# Patient Record
Sex: MALE | Race: WHITE | NOT HISPANIC OR LATINO | Employment: FULL TIME | URBAN - METROPOLITAN AREA
[De-identification: names, ages, dates, MRNs, and addresses within clinical notes are randomized per-mention and may not be internally consistent; named-entity substitution may affect disease eponyms.]

---

## 2019-07-10 ENCOUNTER — OFFICE VISIT (OUTPATIENT)
Dept: URGENT CARE | Facility: MEDICAL CENTER | Age: 64
End: 2019-07-10
Payer: COMMERCIAL

## 2019-07-10 VITALS
TEMPERATURE: 97.9 F | WEIGHT: 162 LBS | HEART RATE: 67 BPM | SYSTOLIC BLOOD PRESSURE: 124 MMHG | HEIGHT: 70 IN | BODY MASS INDEX: 23.19 KG/M2 | DIASTOLIC BLOOD PRESSURE: 83 MMHG | RESPIRATION RATE: 16 BRPM

## 2019-07-10 DIAGNOSIS — M26.609 TMJ DYSFUNCTION: Primary | ICD-10-CM

## 2019-07-10 PROCEDURE — 99203 OFFICE O/P NEW LOW 30 MIN: CPT | Performed by: PHYSICIAN ASSISTANT

## 2019-07-10 RX ORDER — PREDNISONE 20 MG/1
TABLET ORAL
Qty: 18 TABLET | Refills: 0 | Status: SHIPPED | OUTPATIENT
Start: 2019-07-10 | End: 2020-02-14 | Stop reason: ALTCHOICE

## 2019-07-10 RX ORDER — LATANOPROST 50 UG/ML
SOLUTION/ DROPS OPHTHALMIC
Refills: 6 | COMMUNITY
Start: 2019-04-24

## 2019-07-10 NOTE — PATIENT INSTRUCTIONS
Take prednisone as directed  Follow soft diet  Follow up with her dentist symptoms are not improving in the next 2 weeks  Temporomandibular Disorder   WHAT YOU NEED TO KNOW:   Temporomandibular disorder is a condition that causes pain in your jaw  The disorder affects the joint between your temporal bone and your mandible (jawbone)  The muscles and nerves around the joint are also affected  DISCHARGE INSTRUCTIONS:   Medicines:   · Pain medicine: You may be given a prescription medicine to decrease pain  Do not wait until the pain is severe before you take this medicine  · NSAIDs:  These medicines decrease swelling and pain  You can buy NSAIDs without a doctor's order  Ask your healthcare provider which medicine is right for you, and how much to take  Take as directed  NSAIDs can cause stomach bleeding or kidney problems if not taken correctly  · Muscle relaxers  help decrease pain and muscle spasms  · Take your medicine as directed  Contact your healthcare provider if you think your medicine is not helping or if you have side effects  Tell him of her if you are allergic to any medicine  Keep a list of the medicines, vitamins, and herbs you take  Include the amounts, and when and why you take them  Bring the list or the pill bottles to follow-up visits  Carry your medicine list with you in case of an emergency  Follow up with your healthcare provider as directed:  Write down your questions so you remember to ask them during your visits  Manage your symptoms:   · Eat soft foods: Your healthcare provider may suggest that you eat only soft foods for several days  A dietitian may work with you to find foods that are easier to bite, chew, or swallow  Examples are soup, applesauce, cottage cheese, pudding, yogurt, and soft fruits  · Use jaw supporting devices:  Splints may be used to support your jaw or keep your jaw from moving   You may need to wear a mouth guard to keep you from clenching or grinding your teeth while you are sleeping  · Use ice and heat:  Ice helps decrease swelling and pain  Ice may also help prevent tissue damage  Use an ice pack, or put crushed ice in a plastic bag  Cover it with a towel and place it on your jaw for 15 to 20 minutes every hour or as directed  After the first 24 to 48 hours, use heat to decrease pain, swelling, and muscle spasms  Apply heat on the area for 20 to 30 minutes every 2 hours for as many days as directed  Use a heating pad, moist warm compress, or a hot water bottle  · Go to physical therapy:  A physical therapist teaches you exercises to help improve movement and strength, and to decrease pain in your jaw  A speech therapist may help you with swallowing and speech exercises  Contact your healthcare provider if:   · You have a fever  · Your splint or mouth guard is loose  · You have questions or concerns about your condition or care  Return to the emergency department if:   · You have nausea, are vomiting, or cannot keep liquids down  · You have pain that does not go away even after you take your pain medicine  · You have problems breathing, talking, drinking, eating, or swallowing  · Your splint or mouth guard gets damaged or broken  © 2017 [employer]  Information is for End User's use only and may not be sold, redistributed or otherwise used for commercial purposes  All illustrations and images included in CareNotes® are the copyrighted property of [employer]  or [employer]  The above information is an  only  It is not intended as medical advice for individual conditions or treatments  Talk to your doctor, nurse or pharmacist before following any medical regimen to see if it is safe and effective for you

## 2019-07-10 NOTE — PROGRESS NOTES
[facility]        NAME: Lawson Lee is a 60 y o  male  : 1955    MRN: [id number]  DATE: July 10, 2019  TIME: 4:57 PM    Assessment and Plan   TMJ dysfunction [M26 609]  1  TMJ dysfunction  predniSONE 20 mg tablet         Patient Instructions     Follow up with PCP in 3-5 days  Proceed to  ER if symptoms worsen  Take prednisone as directed  Follow soft diet  Follow up with her dentist symptoms are not improving in the next 2 weeks  Chief Complaint     Chief Complaint   Patient presents with    Earache     Patient relates started with left ear pain fro 3 weeks  Has been taking Tylenol for pain  Denies fever  Denies cough and congestion  History of Present Illness       72-year-old male presents for left-sided ear pain for 3 weeks  He denies any fever, chills, sweats, cold symptoms, injury to his ear  He recently visited his dentist a few weeks ago and had a normal examination  Review of Systems   Review of Systems   Constitutional: Negative  HENT: Positive for ear pain  Negative for ear discharge, facial swelling, hearing loss and trouble swallowing            Current Medications       Current Outpatient Medications:     latanoprost (XALATAN) 0 005 % ophthalmic solution, INSTILL 1 DROP INTO BOTH EYES AT BEDTIME, Disp: , Rfl: 6    predniSONE 20 mg tablet, 3 pills daily for 3 days, 2 pills daily for 3 days, 1 pill daily for 3 days, Disp: 18 tablet, Rfl: 0    Current Allergies     Allergies as of 07/10/2019    (No Known Allergies)            The following portions of the patient's history were reviewed and updated as appropriate: allergies, current medications, past family history, past medical history, past social history, past surgical history and problem list     Objective   /83   Pulse 67   Temp 97 9 °F (36 6 °C) (Tympanic)   Resp 16   Ht 5' 10" (1 778 m)   Wt 73 5 kg (162 lb)   BMI 23 24 kg/m²        Physical Exam     Physical Exam   Constitutional: He appears well-developed and well-nourished  No distress  HENT:   Head:       Right Ear: Hearing, tympanic membrane, external ear and ear canal normal    Left Ear: Hearing, tympanic membrane, external ear and ear canal normal    Mouth/Throat: Uvula is midline, oropharynx is clear and moist and mucous membranes are normal    Nursing note and vitals reviewed

## 2020-02-14 ENCOUNTER — OFFICE VISIT (OUTPATIENT)
Dept: URGENT CARE | Facility: MEDICAL CENTER | Age: 65
End: 2020-02-14
Payer: COMMERCIAL

## 2020-02-14 VITALS
HEART RATE: 80 BPM | BODY MASS INDEX: 23.19 KG/M2 | TEMPERATURE: 99 F | HEIGHT: 70 IN | WEIGHT: 162 LBS | SYSTOLIC BLOOD PRESSURE: 126 MMHG | OXYGEN SATURATION: 97 % | RESPIRATION RATE: 20 BRPM | DIASTOLIC BLOOD PRESSURE: 87 MMHG

## 2020-02-14 DIAGNOSIS — J00 ACUTE NASOPHARYNGITIS: Primary | ICD-10-CM

## 2020-02-14 PROCEDURE — 99213 OFFICE O/P EST LOW 20 MIN: CPT | Performed by: PHYSICIAN ASSISTANT

## 2020-02-14 RX ORDER — FLUTICASONE PROPIONATE 50 MCG
2 SPRAY, SUSPENSION (ML) NASAL DAILY
Qty: 16 G | Refills: 0 | Status: SHIPPED | OUTPATIENT
Start: 2020-02-14

## 2020-02-14 RX ORDER — CETIRIZINE HYDROCHLORIDE 10 MG/1
10 TABLET ORAL DAILY
Qty: 30 TABLET | Refills: 0 | Status: SHIPPED | OUTPATIENT
Start: 2020-02-14

## 2020-02-14 RX ORDER — BENZONATATE 100 MG/1
100 CAPSULE ORAL 3 TIMES DAILY PRN
Qty: 15 CAPSULE | Refills: 0 | Status: SHIPPED | OUTPATIENT
Start: 2020-02-14

## 2020-02-14 NOTE — PATIENT INSTRUCTIONS
use medications as directed for symptomatic relief as needed   continue Motrin and/or Tylenol as needed for fevers aches and pains   drink plenty of fluids stay well hydrated  Follow up with PCP in 3-5 days  Proceed to  ER if symptoms worsen  Cold Symptoms   AMBULATORY CARE:   Cold symptoms  include sneezing, dry throat, a stuffy nose, headache, watery eyes, and a cough  Your cough may be dry, or you may cough up mucus  You may also have muscle aches, joint pain, and tiredness  Rarely, you may have a fever  Cold symptoms occur from inflammation in your upper respiratory system caused by a virus  Most colds go away without treatment  Seek care immediately if:   · You have increased tiredness and weakness  · You are unable to eat  · Your heart is beating much faster than usual for you  · You see white spots in the back of your throat and your neck is swollen and sore to the touch  · You see pinpoint or larger reddish-purple dots on your skin  Contact your healthcare provider if:   · You have a fever higher than 102°F (38 9°C)  · You have new or worsening shortness of breath  · You have thick nasal drainage for more than 2 days  · Your symptoms do not improve or get worse within 5 days  · You have questions or concerns about your condition or care  Treatment for cold symptoms  may include NSAIDS to decrease muscle aches and fever  Cold medicines may also be given to decrease coughing, nasal stuffiness, sneezing, and a runny nose  Manage your cold symptoms: The following may help relieve cold symptoms, such as a dry throat and congestion:  · Gargle with mouthwash or warm salt water as directed  · Suck on throat lozenges or hard candy  · Use a cold or warm vaporizer or humidifier to ease your breathing  · Rest for at least 2 days and then as needed to decrease tiredness and weakness       · Use petroleum based jelly around your nostrils to decrease irritation from blowing your nose  · Drink plenty of liquids  Liquids will help thin and loosen thick mucus so you can cough it up  Liquids will also keep you hydrated  Ask your healthcare provider which liquids are best for you and how much to drink each day  Prevent the spread of germs  by washing your hands often  You can spread your cold germs to others for at least 3 days after your symptoms start  Do not share items, such as eating utensils  Cover your nose and mouth when you cough or sneeze using the crook of your elbow instead of your hands  Throw used tissues in the garbage  Do not smoke:  Smoking may worsen your symptoms and increase the length of time you feel sick  Talk with your healthcare provider if you need help to stop smoking  Follow up with your healthcare provider as directed:  Write down your questions so you remember to ask them during your visits  © 2017 [facility] [place][note 2] Information is for End User's use only and may not be sold, redistributed or otherwise used for commercial purposes  All illustrations and images included in CareNotes® are the copyrighted property of [employer]  or [employer]  The above information is an  only  It is not intended as medical advice for individual conditions or treatments  Talk to your doctor, nurse or pharmacist before following any medical regimen to see if it is safe and effective for you

## 2020-02-14 NOTE — PROGRESS NOTES
[facility]        NAME: Alex Lopez is a 60 y o  male  : 1955    MRN: [id number]  DATE: 2020  TIME: 12:03 PM    Assessment and Plan   Acute nasopharyngitis [J00]  1  Acute nasopharyngitis  cetirizine (ZyrTEC) 10 mg tablet    fluticasone (FLONASE) 50 mcg/act nasal spray    benzonatate (TESSALON PERLES) 100 mg capsule         Patient Instructions      use medications as directed for symptomatic relief as needed   continue Motrin and/or Tylenol as needed for fevers aches and pains   drink plenty of fluids stay well hydrated  Follow up with PCP in 3-5 days  Proceed to  ER if symptoms worsen  Chief Complaint     Chief Complaint   Patient presents with    Cough     Patient states he started getting sick since Monday  Patient complains of sore throat, cough, ears hurt, and fatigue  Patient did not sleep last night and called out today         History of Present Illness       30-year-old male presents with cough congestion sore throat facial pressure  Denies any abdominal pain nausea vomiting diarrhea  No chest pain shortness of breath or wheezing  Denies any ear pain  dry cough reported  URI    This is a new problem  The current episode started yesterday  The problem has been waxing and waning  Maximum temperature: Subjective  The fever has been present for 1 to 2 days  Associated symptoms include congestion, coughing, rhinorrhea and a sore throat  Pertinent negatives include no abdominal pain, chest pain, diarrhea, dysuria, nausea or wheezing  He has tried nothing for the symptoms  The treatment provided no relief  Review of Systems   Review of Systems   Constitutional: Negative  HENT: Positive for congestion, rhinorrhea and sore throat  Respiratory: Positive for cough  Negative for wheezing  Cardiovascular: Negative  Negative for chest pain  Gastrointestinal: Negative  Negative for abdominal pain, diarrhea and nausea     Genitourinary: Negative for dysuria  Musculoskeletal: Negative  Skin: Negative  Neurological: Negative  Current Medications       Current Outpatient Medications:     latanoprost (XALATAN) 0 005 % ophthalmic solution, INSTILL 1 DROP INTO BOTH EYES AT BEDTIME, Disp: , Rfl: 6    benzonatate (TESSALON PERLES) 100 mg capsule, Take 1 capsule (100 mg total) by mouth 3 (three) times a day as needed for cough, Disp: 15 capsule, Rfl: 0    cetirizine (ZyrTEC) 10 mg tablet, Take 1 tablet (10 mg total) by mouth daily, Disp: 30 tablet, Rfl: 0    fluticasone (FLONASE) 50 mcg/act nasal spray, 2 sprays into each nostril daily, Disp: 16 g, Rfl: 0    Current Allergies     Allergies as of 02/14/2020    (No Known Allergies)            The following portions of the patient's history were reviewed and updated as appropriate: allergies, current medications, past family history, past medical history, past social history, past surgical history and problem list      Past Medical History:   Diagnosis Date    Glaucoma        Past Surgical History:   Procedure Laterality Date    JOINT REPLACEMENT      Bilaeral knee replacements       History reviewed  No pertinent family history  Medications have been verified  Objective   /87   Pulse 80   Temp 99 °F (37 2 °C)   Resp 20   Ht 5' 10" (1 778 m)   Wt 73 5 kg (162 lb)   SpO2 97%   BMI 23 24 kg/m²        Physical Exam     Physical Exam   Constitutional: He is oriented to person, place, and time  He appears well-developed and well-nourished  No distress  HENT:   Head: Normocephalic and atraumatic  Right Ear: Hearing, tympanic membrane, external ear and ear canal normal    Left Ear: Hearing, tympanic membrane, external ear and ear canal normal    Nose: Nose normal    Mouth/Throat: Uvula is midline and mucous membranes are normal  Posterior oropharyngeal erythema (Mild) present  No oropharyngeal exudate  Eyes: Conjunctivae and EOM are normal  Right eye exhibits no discharge  Left eye exhibits no discharge  Neck: Normal range of motion  Neck supple  Cardiovascular: Normal rate, regular rhythm, normal heart sounds and intact distal pulses  No murmur heard  Pulmonary/Chest: Effort normal and breath sounds normal  No respiratory distress  He has no wheezes  He has no rales  Abdominal: Soft  Bowel sounds are normal  There is no tenderness  Musculoskeletal: Normal range of motion  Lymphadenopathy:     He has no cervical adenopathy  Neurological: He is alert and oriented to person, place, and time  Skin: Skin is warm and dry  Psychiatric: He has a normal mood and affect  Nursing note and vitals reviewed

## 2021-04-09 ENCOUNTER — IMMUNIZATIONS (OUTPATIENT)
Dept: FAMILY MEDICINE CLINIC | Facility: HOSPITAL | Age: 66
End: 2021-04-09

## 2021-04-09 DIAGNOSIS — Z23 ENCOUNTER FOR IMMUNIZATION: Primary | ICD-10-CM

## 2021-04-09 PROCEDURE — 0011A SARS-COV-2 / COVID-19 MRNA VACCINE (MODERNA) 100 MCG: CPT

## 2021-04-09 PROCEDURE — 91301 SARS-COV-2 / COVID-19 MRNA VACCINE (MODERNA) 100 MCG: CPT

## 2021-05-12 ENCOUNTER — IMMUNIZATIONS (OUTPATIENT)
Dept: FAMILY MEDICINE CLINIC | Facility: HOSPITAL | Age: 66
End: 2021-05-12

## 2021-05-12 DIAGNOSIS — Z23 ENCOUNTER FOR IMMUNIZATION: Primary | ICD-10-CM

## 2021-05-12 PROCEDURE — 91301 SARS-COV-2 / COVID-19 MRNA VACCINE (MODERNA) 100 MCG: CPT

## 2021-05-12 PROCEDURE — 0012A SARS-COV-2 / COVID-19 MRNA VACCINE (MODERNA) 100 MCG: CPT

## 2024-03-04 ENCOUNTER — OFFICE VISIT (OUTPATIENT)
Dept: FAMILY MEDICINE CLINIC | Facility: CLINIC | Age: 69
End: 2024-03-04
Payer: MEDICARE

## 2024-03-04 VITALS
OXYGEN SATURATION: 97 % | HEIGHT: 69 IN | DIASTOLIC BLOOD PRESSURE: 60 MMHG | BODY MASS INDEX: 25.53 KG/M2 | SYSTOLIC BLOOD PRESSURE: 124 MMHG | HEART RATE: 63 BPM | TEMPERATURE: 97.7 F | WEIGHT: 172.4 LBS

## 2024-03-04 DIAGNOSIS — Z13.220 NEED FOR LIPID SCREENING: ICD-10-CM

## 2024-03-04 DIAGNOSIS — Z12.11 SCREENING FOR COLON CANCER: ICD-10-CM

## 2024-03-04 DIAGNOSIS — Z12.5 SCREENING FOR PROSTATE CANCER: ICD-10-CM

## 2024-03-04 DIAGNOSIS — Z00.00 INITIAL MEDICARE ANNUAL WELLNESS VISIT: Primary | ICD-10-CM

## 2024-03-04 PROBLEM — H40.10X0 OPEN-ANGLE GLAUCOMA OF BOTH EYES: Status: ACTIVE | Noted: 2024-03-04

## 2024-03-04 PROCEDURE — G0438 PPPS, INITIAL VISIT: HCPCS | Performed by: FAMILY MEDICINE

## 2024-03-04 NOTE — PROGRESS NOTES
"Chief Complaint   Patient presents with    Establish Care     Order cologuard Medicare Wellness Visit        HPI   69-year-old male presents as a new patient.  Past medical history is unremarkable.  No chronic medical problems.  Does not take any medications.  Allergic to ampicillin which causes hives.  Non-smoker.  No alcohol.      Past Medical History:   Diagnosis Date    Glaucoma     Open-angle glaucoma of both eyes 03/04/2024        Past Surgical History:   Procedure Laterality Date    JOINT REPLACEMENT      Bilaeral knee replacements       Social History     Tobacco Use    Smoking status: Never    Smokeless tobacco: Never   Substance Use Topics    Alcohol use: Never       Social History     Social History Narrative     since 1979.  Together since wife was 16.    2 sons.  No grandchildren.    Retired at age 67.  Was a .    Enjoys [employer] airplanes, motorcycle, and purchased an RV.  First trip to [place] 4/9/2024.        The following portions of the patient's history were reviewed and updated as appropriate: allergies, current medications, past family history, past medical history, past social history, past surgical history, and problem list.      Review of Systems   No chest pain or shortness of breath.  Bowels are okay.  Urine stream good.  No difficulty with erections.    /60 (BP Location: Left arm, Patient Position: Sitting, Cuff Size: Standard)   Pulse 63   Temp 97.7 °F (36.5 °C) (Temporal)   Ht 5' 9\" (1.753 m)   Wt 78.2 kg (172 lb 6.4 oz)   SpO2 97%   BMI 25.46 kg/m²      Physical Exam   Appears well.  Lungs are clear.  Heart regular with no murmurs or gallops.  Abdomen soft and nontender.  Mood is upbeat.  Affect appropriate.              Current Outpatient Medications:     latanoprost (XALATAN) 0.005 % ophthalmic solution, INSTILL 1 DROP INTO BOTH EYES AT BEDTIME, Disp: , Rfl: 6     No problem-specific Assessment & Plan notes found for this encounter.       Diagnoses and " all orders for this visit:    Initial Medicare annual wellness visit    Screening for colon cancer  -     Cologuard    Need for lipid screening  -     Lipid panel; Future    Screening for prostate cancer  -     PSA, Total Screen; Future        Patient Instructions   Medicare wellness exam is completed.  Cologuard for screening for colon cancer.  Recommended vaccines include shingles and pneumonia.  He will consider.  Blood work discussed.  Not unreasonable to do a cholesterol test although he has no risk factors and his parents lived well into the 80s and 90s.  Pros and cons of prostate cancer screening discussed and handout given from the choosing wisely program.  After consideration, opts to have PSA done.  Will also check lipid profile.  Recheck 1 year or as needed.    Medicare Preventive Visit Patient Instructions  Thank you for completing your Welcome to Medicare Visit or Medicare Annual Wellness Visit today. Your next wellness visit will be due in one year (3/5/2025).  The screening/preventive services that you may require over the next 5-10 years are detailed below. Some tests may not apply to you based off risk factors and/or age. Screening tests ordered at today's visit but not completed yet may show as past due. Also, please note that scanned in results may not display below.  Preventive Screenings:  Service Recommendations Previous Testing/Comments   Colorectal Cancer Screening  Colonoscopy    Fecal Occult Blood Test (FOBT)/Fecal Immunochemical Test (FIT)  Fecal DNA/Cologuard Test  Flexible Sigmoidoscopy Age: 45-75 years old   Colonoscopy: every 10 years (May be performed more frequently if at higher risk)  OR  FOBT/FIT: every 1 year  OR  Cologuard: every 3 years  OR  Sigmoidoscopy: every 5 years  Screening may be recommended earlier than age 45 if at higher risk for colorectal cancer. Also, an individualized decision between you and your healthcare provider will decide whether screening between the ages  of 76-85 would be appropriate. Colonoscopy: Not on file  FOBT/FIT: Not on file  Cologuard: Not on file  Sigmoidoscopy: Not on file          Prostate Cancer Screening Individualized decision between patient and health care provider in men between ages of 55-69   Medicare will cover every 12 months beginning on the day after your 50th birthday PSA: No results in last 5 years           Hepatitis C Screening Once for adults born between 1945 and 1965  More frequently in patients at high risk for Hepatitis C Hep C Antibody: Not on file        Diabetes Screening 1-2 times per year if you're at risk for diabetes or have pre-diabetes Fasting glucose: No results in last 5 years (No results in last 5 years)  A1C: No results in last 5 years (No results in last 5 years)      Cholesterol Screening Once every 5 years if you don't have a lipid disorder. May order more often based on risk factors. Lipid panel: Not on file         Other Preventive Screenings Covered by Medicare:  Abdominal Aortic Aneurysm (AAA) Screening: covered once if your at risk. You're considered to be at risk if you have a family history of AAA or a male between the age of 65-75 who smoking at least 100 cigarettes in your lifetime.  Lung Cancer Screening: covers low dose CT scan once per year if you meet all of the following conditions: (1) Age 55-77; (2) No signs or symptoms of lung cancer; (3) Current smoker or have quit smoking within the last 15 years; (4) You have a tobacco smoking history of at least 20 pack years (packs per day x number of years you smoked); (5) You get a written order from a healthcare provider.  Glaucoma Screening: covered annually if you're considered high risk: (1) You have diabetes OR (2) Family history of glaucoma OR (3)  aged 50 and older OR (4)  American aged 65 and older  Osteoporosis Screening: covered every 2 years if you meet one of the following conditions: (1) Have a vertebral abnormality; (2) On  glucocorticoid therapy for more than 3 months; (3) Have primary hyperparathyroidism; (4) On osteoporosis medications and need to assess response to drug therapy.  HIV Screening: covered annually if you're between the age of 15-65. Also covered annually if you are younger than 15 and older than 65 with risk factors for HIV infection. For pregnant patients, it is covered up to 3 times per pregnancy.    Immunizations:  Immunization Recommendations   Influenza Vaccine Annual influenza vaccination during flu season is recommended for all persons aged >= 6 months who do not have contraindications   Pneumococcal Vaccine   * Pneumococcal conjugate vaccine = PCV13 (Prevnar 13), PCV15 (Vaxneuvance), PCV20 (Prevnar 20)  * Pneumococcal polysaccharide vaccine = PPSV23 (Pneumovax) Adults 19-63 yo with certain risk factors or if 65+ yo  If never received any pneumonia vaccine: recommend Prevnar 20 (PCV20)  Give PCV20 if previously received 1 dose of PCV13 or PPSV23   Hepatitis B Vaccine 3 dose series if at intermediate or high risk (ex: diabetes, end stage renal disease, liver disease)   Respiratory syncytial virus (RSV) Vaccine - COVERED BY MEDICARE PART D  * RSVPreF3 (Arexvy) CDC recommends that adults 60 years of age and older may receive a single dose of RSV vaccine using shared clinical decision-making (SCDM)   Tetanus (Td) Vaccine - COST NOT COVERED BY MEDICARE PART B Following completion of primary series, a booster dose should be given every 10 years to maintain immunity against tetanus. Td may also be given as tetanus wound prophylaxis.   Tdap Vaccine - COST NOT COVERED BY MEDICARE PART B Recommended at least once for all adults. For pregnant patients, recommended with each pregnancy.   Shingles Vaccine (Shingrix) - COST NOT COVERED BY MEDICARE PART B  2 shot series recommended in those 19 years and older who have or will have weakened immune systems or those 50 years and older     Health Maintenance Due:      Topic Date  Due    Hepatitis C Screening  Never done    Colorectal Cancer Screening  Never done     Immunizations Due:      Topic Date Due    Pneumococcal Vaccine: 65+ Years (1 of 1 - PCV) Never done    Influenza Vaccine (1) Never done    COVID-19 Vaccine (3 - 2023-24 season) 09/01/2023     Advance Directives   What are advance directives?  Advance directives are legal documents that state your wishes and plans for medical care. These plans are made ahead of time in case you lose your ability to make decisions for yourself. Advance directives can apply to any medical decision, such as the treatments you want, and if you want to donate organs.   What are the types of advance directives?  There are many types of advance directives, and each state has rules about how to use them. You may choose a combination of any of the following:  Living will:  This is a written record of the treatment you want. You can also choose which treatments you do not want, which to limit, and which to stop at a certain time. This includes surgery, medicine, IV fluid, and tube feedings.   Durable power of  for healthcare (DPAHC):  This is a written record that states who you want to make healthcare choices for you when you are unable to make them for yourself. This person, called a proxy, is usually a family member or a friend. You may choose more than 1 proxy.  Do not resuscitate (DNR) order:  A DNR order is used in case your heart stops beating or you stop breathing. It is a request not to have certain forms of treatment, such as CPR. A DNR order may be included in other types of advance directives.  Medical directive:  This covers the care that you want if you are in a coma, near death, or unable to make decisions for yourself. You can list the treatments you want for each condition. Treatment may include pain medicine, surgery, blood transfusions, dialysis, IV or tube feedings, and a ventilator (breathing machine).  Values history:  This  document has questions about your views, beliefs, and how you feel and think about life. This information can help others choose the care that you would choose.  Why are advance directives important?  An advance directive helps you control your care. Although spoken wishes may be used, it is better to have your wishes written down. Spoken wishes can be misunderstood, or not followed. Treatments may be given even if you do not want them. An advance directive may make it easier for your family to make difficult choices about your care.   Weight Management   Why it is important to manage your weight:  Being overweight increases your risk of health conditions such as heart disease, high blood pressure, type 2 diabetes, and certain types of cancer. It can also increase your risk for osteoarthritis, sleep apnea, and other respiratory problems. Aim for a slow, steady weight loss. Even a small amount of weight loss can lower your risk of health problems.  How to lose weight safely:  A safe and healthy way to lose weight is to eat fewer calories and get regular exercise. You can lose up about 1 pound a week by decreasing the number of calories you eat by 500 calories each day.   Healthy meal plan for weight management:  A healthy meal plan includes a variety of foods, contains fewer calories, and helps you stay healthy. A healthy meal plan includes the following:  Eat whole-grain foods more often.  A healthy meal plan should contain fiber. Fiber is the part of grains, fruits, and vegetables that is not broken down by your body. Whole-grain foods are healthy and provide extra fiber in your diet. Some examples of whole-grain foods are whole-wheat breads and pastas, oatmeal, brown rice, and bulgur.  Eat a variety of vegetables every day.  Include dark, leafy greens such as spinach, kale, j carlos greens, and mustard greens. Eat yellow and orange vegetables such as carrots, sweet potatoes, and winter squash.   Eat a variety of  fruits every day.  Choose fresh or canned fruit (canned in its own juice or light syrup) instead of juice. Fruit juice has very little or no fiber.  Eat low-fat dairy foods.  Drink fat-free (skim) milk or 1% milk. Eat fat-free yogurt and low-fat cottage cheese. Try low-fat cheeses such as mozzarella and other reduced-fat cheeses.  Choose meat and other protein foods that are low in fat.  Choose beans or other legumes such as split peas or lentils. Choose fish, skinless poultry (chicken or turkey), or lean cuts of red meat (beef or pork). Before you cook meat or poultry, cut off any visible fat.   Use less fat and oil.  Try baking foods instead of frying them. Add less fat, such as margarine, sour cream, regular salad dressing and mayonnaise to foods. Eat fewer high-fat foods. Some examples of high-fat foods include french fries, doughnuts, ice cream, and cakes.  Eat fewer sweets.  Limit foods and drinks that are high in sugar. This includes candy, cookies, regular soda, and sweetened drinks.  Exercise:  Exercise at least 30 minutes per day on most days of the week. Some examples of exercise include walking, biking, dancing, and swimming. You can also fit in more physical activity by taking the stairs instead of the elevator or parking farther away from stores. Ask your healthcare provider about the best exercise plan for you.      © Copyright [employer] 2018 Information is for End User's use only and may not be sold, redistributed or otherwise used for commercial purposes. All illustrations and images included in CareNotes® are the copyrighted property of [employer] or [employer]

## 2024-03-04 NOTE — PROGRESS NOTES
Assessment and Plan:     Problem List Items Addressed This Visit    None  Visit Diagnoses       Medicare annual wellness visit, subsequent    -  Primary               Preventive health issues were discussed with patient, and age appropriate screening tests were ordered as noted in patient's After Visit Summary.  Personalized health advice and appropriate referrals for health education or preventive services given if needed, as noted in patient's After Visit Summary.     History of Present Illness:     Patient presents for a Medicare Wellness Visit    HPI   Patient Care Team:  Tanner Lantigua MD as PCP - General (Family Medicine)     Review of Systems:     Review of Systems     Problem List:     There is no problem list on file for this patient.     Past Medical and Surgical History:     Past Medical History:   Diagnosis Date    Glaucoma      Past Surgical History:   Procedure Laterality Date    JOINT REPLACEMENT      Bilaeral knee replacements      Family History:     No family history on file.   Social History:     Social History     Socioeconomic History    Marital status: Single     Spouse name: None    Number of children: None    Years of education: None    Highest education level: None   Occupational History    None   Tobacco Use    Smoking status: Never    Smokeless tobacco: Never   Vaping Use    Vaping status: Never Used   Substance and Sexual Activity    Alcohol use: Never    Drug use: Never    Sexual activity: None   Other Topics Concern    None   Social History Narrative    None     Social Determinants of Health     Financial Resource Strain: Not on file   Food Insecurity: Not on file   Transportation Needs: Not on file   Physical Activity: Not on file   Stress: Not on file   Social Connections: Not on file   Intimate Partner Violence: Not on file   Housing Stability: Not on file      Medications and Allergies:     Current Outpatient Medications   Medication Sig Dispense Refill    latanoprost (XALATAN)  0.005 % ophthalmic solution INSTILL 1 DROP INTO BOTH EYES AT BEDTIME  6     No current facility-administered medications for this visit.     Allergies   Allergen Reactions    Ampicillin Hives      Immunizations:     Immunization History   Administered Date(s) Administered    COVID-19 MODERNA VACC 0.5 ML IM 04/09/2021, 05/12/2021      Health Maintenance:         Topic Date Due    Hepatitis C Screening  Never done    Colorectal Cancer Screening  Never done         Topic Date Due    Pneumococcal Vaccine: 65+ Years (1 of 1 - PCV) Never done    Influenza Vaccine (1) Never done    COVID-19 Vaccine (3 - 2023-24 season) 09/01/2023      Medicare Screening Tests and Risk Assessments:     Rodri is here for his Subsequent Wellness visit.     Health Risk Assessment:   Patient rates overall health as very good. Patient feels that their physical health rating is same. Patient is satisfied with their life. Eyesight was rated as same. Hearing was rated as same. Patient feels that their emotional and mental health rating is slightly better. Patients states they are never, rarely angry. Patient states they are never, rarely unusually tired/fatigued. Pain experienced in the last 7 days has been none. Patient states that he has experienced no weight loss or gain in last 6 months.     Depression Screening:   PHQ-2 Score: 0      Fall Risk Screening:   In the past year, patient has experienced: no history of falling in past year      Home Safety:  Patient does not have trouble with stairs inside or outside of their home. Patient has working smoke alarms and has working carbon monoxide detector. Home safety hazards include: none.     Nutrition:   Current diet is Regular.     Medications:   Patient is not currently taking any over-the-counter supplements. Patient is able to manage medications.     Activities of Daily Living (ADLs)/Instrumental Activities of Daily Living (IADLs):   Walk and transfer into and out of bed and chair?:  "Yes  Dress and groom yourself?: Yes    Bathe or shower yourself?: Yes    Feed yourself? Yes  Do your laundry/housekeeping?: Yes  Manage your money, pay your bills and track your expenses?: Yes  Make your own meals?: Yes    Do your own shopping?: Yes    Previous Hospitalizations:   Any hospitalizations or ED visits within the last 12 months?: No      Advance Care Planning:   Living will: Yes    Durable POA for healthcare: Yes    Advanced directive: Yes      PREVENTIVE SCREENINGS        Abdominal Aortic Aneurysm (AAA) Screening:    Risk factors include: age between 65-76 yo        Lung Cancer Screening:     General: Screening Not Indicated    Screening, Brief Intervention, and Referral to Treatment (SBIRT)    Screening  Typical number of drinks in a day: 0  Typical number of drinks in a week: 0  Interpretation: Low risk drinking behavior.    Single Item Drug Screening:  How often have you used an illegal drug (including marijuana) or a prescription medication for non-medical reasons in the past year? never    Single Item Drug Screen Score: 0  Interpretation: Negative screen for possible drug use disorder    No results found.     Physical Exam:     /60 (BP Location: Left arm, Patient Position: Sitting, Cuff Size: Standard)   Pulse 63   Temp 97.7 °F (36.5 °C) (Temporal)   Ht 5' 9\" (1.753 m)   Wt 78.2 kg (172 lb 6.4 oz)   SpO2 97%   BMI 25.46 kg/m²     Physical Exam     Tanner Lantigua MD  "

## 2024-03-04 NOTE — PATIENT INSTRUCTIONS
Medicare wellness exam is completed.  Cologuard for screening for colon cancer.  Recommended vaccines include shingles and pneumonia.  He will consider.  Blood work discussed.  Not unreasonable to do a cholesterol test although he has no risk factors and his parents lived well into the 80s and 90s.  Pros and cons of prostate cancer screening discussed and handout given from the choosing wisely program.  After consideration, opts to have PSA done.  Will also check lipid profile.  Recheck 1 year or as needed.    Medicare Preventive Visit Patient Instructions  Thank you for completing your Welcome to Medicare Visit or Medicare Annual Wellness Visit today. Your next wellness visit will be due in one year (3/5/2025).  The screening/preventive services that you may require over the next 5-10 years are detailed below. Some tests may not apply to you based off risk factors and/or age. Screening tests ordered at today's visit but not completed yet may show as past due. Also, please note that scanned in results may not display below.  Preventive Screenings:  Service Recommendations Previous Testing/Comments   Colorectal Cancer Screening  Colonoscopy    Fecal Occult Blood Test (FOBT)/Fecal Immunochemical Test (FIT)  Fecal DNA/Cologuard Test  Flexible Sigmoidoscopy Age: 45-75 years old   Colonoscopy: every 10 years (May be performed more frequently if at higher risk)  OR  FOBT/FIT: every 1 year  OR  Cologuard: every 3 years  OR  Sigmoidoscopy: every 5 years  Screening may be recommended earlier than age 45 if at higher risk for colorectal cancer. Also, an individualized decision between you and your healthcare provider will decide whether screening between the ages of 76-85 would be appropriate. Colonoscopy: Not on file  FOBT/FIT: Not on file  Cologuard: Not on file  Sigmoidoscopy: Not on file          Prostate Cancer Screening Individualized decision between patient and health care provider in men between ages of 55-69    Medicare will cover every 12 months beginning on the day after your 50th birthday PSA: No results in last 5 years           Hepatitis C Screening Once for adults born between 1945 and 1965  More frequently in patients at high risk for Hepatitis C Hep C Antibody: Not on file        Diabetes Screening 1-2 times per year if you're at risk for diabetes or have pre-diabetes Fasting glucose: No results in last 5 years (No results in last 5 years)  A1C: No results in last 5 years (No results in last 5 years)      Cholesterol Screening Once every 5 years if you don't have a lipid disorder. May order more often based on risk factors. Lipid panel: Not on file         Other Preventive Screenings Covered by Medicare:  Abdominal Aortic Aneurysm (AAA) Screening: covered once if your at risk. You're considered to be at risk if you have a family history of AAA or a male between the age of 65-75 who smoking at least 100 cigarettes in your lifetime.  Lung Cancer Screening: covers low dose CT scan once per year if you meet all of the following conditions: (1) Age 55-77; (2) No signs or symptoms of lung cancer; (3) Current smoker or have quit smoking within the last 15 years; (4) You have a tobacco smoking history of at least 20 pack years (packs per day x number of years you smoked); (5) You get a written order from a healthcare provider.  Glaucoma Screening: covered annually if you're considered high risk: (1) You have diabetes OR (2) Family history of glaucoma OR (3)  aged 50 and older OR (4)  American aged 65 and older  Osteoporosis Screening: covered every 2 years if you meet one of the following conditions: (1) Have a vertebral abnormality; (2) On glucocorticoid therapy for more than 3 months; (3) Have primary hyperparathyroidism; (4) On osteoporosis medications and need to assess response to drug therapy.  HIV Screening: covered annually if you're between the age of 15-65. Also covered annually if you  are younger than 15 and older than 65 with risk factors for HIV infection. For pregnant patients, it is covered up to 3 times per pregnancy.    Immunizations:  Immunization Recommendations   Influenza Vaccine Annual influenza vaccination during flu season is recommended for all persons aged >= 6 months who do not have contraindications   Pneumococcal Vaccine   * Pneumococcal conjugate vaccine = PCV13 (Prevnar 13), PCV15 (Vaxneuvance), PCV20 (Prevnar 20)  * Pneumococcal polysaccharide vaccine = PPSV23 (Pneumovax) Adults 19-63 yo with certain risk factors or if 65+ yo  If never received any pneumonia vaccine: recommend Prevnar 20 (PCV20)  Give PCV20 if previously received 1 dose of PCV13 or PPSV23   Hepatitis B Vaccine 3 dose series if at intermediate or high risk (ex: diabetes, end stage renal disease, liver disease)   Respiratory syncytial virus (RSV) Vaccine - COVERED BY MEDICARE PART D  * RSVPreF3 (Arexvy) CDC recommends that adults 60 years of age and older may receive a single dose of RSV vaccine using shared clinical decision-making (SCDM)   Tetanus (Td) Vaccine - COST NOT COVERED BY MEDICARE PART B Following completion of primary series, a booster dose should be given every 10 years to maintain immunity against tetanus. Td may also be given as tetanus wound prophylaxis.   Tdap Vaccine - COST NOT COVERED BY MEDICARE PART B Recommended at least once for all adults. For pregnant patients, recommended with each pregnancy.   Shingles Vaccine (Shingrix) - COST NOT COVERED BY MEDICARE PART B  2 shot series recommended in those 19 years and older who have or will have weakened immune systems or those 50 years and older     Health Maintenance Due:      Topic Date Due    Hepatitis C Screening  Never done    Colorectal Cancer Screening  Never done     Immunizations Due:      Topic Date Due    Pneumococcal Vaccine: 65+ Years (1 of 1 - PCV) Never done    Influenza Vaccine (1) Never done    COVID-19 Vaccine (3 - 2023-24  season) 09/01/2023     Advance Directives   What are advance directives?  Advance directives are legal documents that state your wishes and plans for medical care. These plans are made ahead of time in case you lose your ability to make decisions for yourself. Advance directives can apply to any medical decision, such as the treatments you want, and if you want to donate organs.   What are the types of advance directives?  There are many types of advance directives, and each state has rules about how to use them. You may choose a combination of any of the following:  Living will:  This is a written record of the treatment you want. You can also choose which treatments you do not want, which to limit, and which to stop at a certain time. This includes surgery, medicine, IV fluid, and tube feedings.   Durable power of  for healthcare (DPAHC):  This is a written record that states who you want to make healthcare choices for you when you are unable to make them for yourself. This person, called a proxy, is usually a family member or a friend. You may choose more than 1 proxy.  Do not resuscitate (DNR) order:  A DNR order is used in case your heart stops beating or you stop breathing. It is a request not to have certain forms of treatment, such as CPR. A DNR order may be included in other types of advance directives.  Medical directive:  This covers the care that you want if you are in a coma, near death, or unable to make decisions for yourself. You can list the treatments you want for each condition. Treatment may include pain medicine, surgery, blood transfusions, dialysis, IV or tube feedings, and a ventilator (breathing machine).  Values history:  This document has questions about your views, beliefs, and how you feel and think about life. This information can help others choose the care that you would choose.  Why are advance directives important?  An advance directive helps you control your care. Although  spoken wishes may be used, it is better to have your wishes written down. Spoken wishes can be misunderstood, or not followed. Treatments may be given even if you do not want them. An advance directive may make it easier for your family to make difficult choices about your care.   Weight Management   Why it is important to manage your weight:  Being overweight increases your risk of health conditions such as heart disease, high blood pressure, type 2 diabetes, and certain types of cancer. It can also increase your risk for osteoarthritis, sleep apnea, and other respiratory problems. Aim for a slow, steady weight loss. Even a small amount of weight loss can lower your risk of health problems.  How to lose weight safely:  A safe and healthy way to lose weight is to eat fewer calories and get regular exercise. You can lose up about 1 pound a week by decreasing the number of calories you eat by 500 calories each day.   Healthy meal plan for weight management:  A healthy meal plan includes a variety of foods, contains fewer calories, and helps you stay healthy. A healthy meal plan includes the following:  Eat whole-grain foods more often.  A healthy meal plan should contain fiber. Fiber is the part of grains, fruits, and vegetables that is not broken down by your body. Whole-grain foods are healthy and provide extra fiber in your diet. Some examples of whole-grain foods are whole-wheat breads and pastas, oatmeal, brown rice, and bulgur.  Eat a variety of vegetables every day.  Include dark, leafy greens such as spinach, kale, j carlos greens, and mustard greens. Eat yellow and orange vegetables such as carrots, sweet potatoes, and winter squash.   Eat a variety of fruits every day.  Choose fresh or canned fruit (canned in its own juice or light syrup) instead of juice. Fruit juice has very little or no fiber.  Eat low-fat dairy foods.  Drink fat-free (skim) milk or 1% milk. Eat fat-free yogurt and low-fat cottage cheese.  Try low-fat cheeses such as mozzarella and other reduced-fat cheeses.  Choose meat and other protein foods that are low in fat.  Choose beans or other legumes such as split peas or lentils. Choose fish, skinless poultry (chicken or turkey), or lean cuts of red meat (beef or pork). Before you cook meat or poultry, cut off any visible fat.   Use less fat and oil.  Try baking foods instead of frying them. Add less fat, such as margarine, sour cream, regular salad dressing and mayonnaise to foods. Eat fewer high-fat foods. Some examples of high-fat foods include french fries, doughnuts, ice cream, and cakes.  Eat fewer sweets.  Limit foods and drinks that are high in sugar. This includes candy, cookies, regular soda, and sweetened drinks.  Exercise:  Exercise at least 30 minutes per day on most days of the week. Some examples of exercise include walking, biking, dancing, and swimming. You can also fit in more physical activity by taking the stairs instead of the elevator or parking farther away from stores. Ask your healthcare provider about the best exercise plan for you.      © Copyright [employer] 2018 Information is for End User's use only and may not be sold, redistributed or otherwise used for commercial purposes. All illustrations and images included in CareNotes® are the copyrighted property of A.D.A.M., Inc. or [employer]

## 2024-03-16 LAB — COLOGUARD RESULT REPORTABLE: NEGATIVE

## 2024-03-19 ENCOUNTER — LAB (OUTPATIENT)
Dept: LAB | Facility: CLINIC | Age: 69
End: 2024-03-19
Payer: MEDICARE

## 2024-03-19 DIAGNOSIS — Z13.220 NEED FOR LIPID SCREENING: ICD-10-CM

## 2024-03-19 DIAGNOSIS — Z12.5 SCREENING FOR PROSTATE CANCER: ICD-10-CM

## 2024-03-19 LAB
CHOLEST SERPL-MCNC: 196 MG/DL
HDLC SERPL-MCNC: 46 MG/DL
LDLC SERPL CALC-MCNC: 123 MG/DL (ref 0–100)
NONHDLC SERPL-MCNC: 150 MG/DL
PSA SERPL-MCNC: 0.39 NG/ML (ref 0–4)
TRIGL SERPL-MCNC: 133 MG/DL

## 2024-03-19 PROCEDURE — G0103 PSA SCREENING: HCPCS

## 2024-03-19 PROCEDURE — 36415 COLL VENOUS BLD VENIPUNCTURE: CPT

## 2024-03-19 PROCEDURE — 80061 LIPID PANEL: CPT

## 2024-07-02 ENCOUNTER — OFFICE VISIT (OUTPATIENT)
Dept: FAMILY MEDICINE CLINIC | Facility: CLINIC | Age: 69
End: 2024-07-02
Payer: MEDICARE

## 2024-07-02 VITALS
DIASTOLIC BLOOD PRESSURE: 70 MMHG | WEIGHT: 166 LBS | OXYGEN SATURATION: 97 % | SYSTOLIC BLOOD PRESSURE: 122 MMHG | TEMPERATURE: 97.6 F | RESPIRATION RATE: 16 BRPM | BODY MASS INDEX: 24.59 KG/M2 | HEIGHT: 69 IN | HEART RATE: 54 BPM

## 2024-07-02 DIAGNOSIS — H60.543 ECZEMATOID OTITIS EXTERNA OF BOTH EARS, UNSPECIFIED CHRONICITY: Primary | ICD-10-CM

## 2024-07-02 PROCEDURE — G2211 COMPLEX E/M VISIT ADD ON: HCPCS | Performed by: FAMILY MEDICINE

## 2024-07-02 PROCEDURE — 99213 OFFICE O/P EST LOW 20 MIN: CPT | Performed by: FAMILY MEDICINE

## 2024-07-02 RX ORDER — MOMETASONE FUROATE 1 MG/ML
SOLUTION TOPICAL
Qty: 60 ML | Refills: 5 | Status: SHIPPED | OUTPATIENT
Start: 2024-07-02

## 2024-07-02 NOTE — PROGRESS NOTES
"Chief Complaint   Patient presents with    Earache     Painful. Left ear and progressing to the right         HPI   Here with pain in the left ear.  He gets scabby when he puts in his hearing aids.  Having discomfort on the right side as well.  Has had hearing aids for the last 3 or 4 months.  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  Which were not bothersome.    Past Medical History:   Diagnosis Date    Glaucoma     Open-angle glaucoma of both eyes 03/04/2024        Past Surgical History:   Procedure Laterality Date    JOINT REPLACEMENT      Bilaeral knee replacements       Social History     Tobacco Use    Smoking status: Never    Smokeless tobacco: Never   Substance Use Topics    Alcohol use: Never       Social History     Social History Narrative     since 1979.  Together since wife was 16.    2 sons.  No grandchildren.    Retired at age 67.  Was a .    Enjoys [employer] airplanes, motorcycle, and purchased an RV.  First trip to [place] 4/9/2024.Went well.     7/24- going to [place].        The following portions of the patient's history were reviewed and updated as appropriate: allergies, current medications, past family history, past medical history, past social history, past surgical history, and problem list.      Review of Systems       /70 (BP Location: Left arm, Patient Position: Sitting, Cuff Size: Standard)   Pulse (!) 54   Temp 97.6 °F (36.4 °C) (Temporal)   Resp 16   Ht 5' 9\" (1.753 m)   Wt 75.3 kg (166 lb)   SpO2 97%   BMI 24.51 kg/m²      Physical Exam   No tenderness on palpation of the tragus.  Both ear canals are open without irritation seen.  Both eardrums appear white.              Current Outpatient Medications:     latanoprost (XALATAN) 0.005 % ophthalmic solution, INSTILL 1 DROP INTO BOTH EYES AT BEDTIME, Disp: , Rfl: 6    mometasone (ELOCON) 0.1 % lotion, Apply at bedtime if needed for irritation, Disp: 60 mL, Rfl: 5     No problem-specific Assessment " & Plan notes found for this encounter.       Diagnoses and all orders for this visit:    Eczematoid otitis externa of both ears, unspecified chronicity  -     mometasone (ELOCON) 0.1 % lotion; Apply at bedtime if needed for irritation        Patient Instructions   Suspect irritation of the ear canal secondary to the hearing aid.  Apply mometasone lotion gently with a Q-tip to affected areas daily as needed.  Suggest returning to [facility] to see if the piece at the end of the hearing aid that goes in the ear could be changed or adjusted.

## 2024-07-02 NOTE — PATIENT INSTRUCTIONS
Suspect irritation of the ear canal secondary to the hearing aid.  Apply mometasone lotion gently with a Q-tip to affected areas daily as needed.  Suggest returning to [facility] to see if the piece at the end of the hearing aid that goes in the ear could be changed or adjusted.

## 2025-04-21 ENCOUNTER — OFFICE VISIT (OUTPATIENT)
Dept: FAMILY MEDICINE CLINIC | Facility: CLINIC | Age: 70
End: 2025-04-21
Payer: MEDICARE

## 2025-04-21 VITALS
DIASTOLIC BLOOD PRESSURE: 70 MMHG | HEIGHT: 69 IN | TEMPERATURE: 97.9 F | SYSTOLIC BLOOD PRESSURE: 102 MMHG | WEIGHT: 163.6 LBS | OXYGEN SATURATION: 96 % | BODY MASS INDEX: 24.23 KG/M2 | HEART RATE: 62 BPM

## 2025-04-21 DIAGNOSIS — F40.240 CLAUSTROPHOBIA: ICD-10-CM

## 2025-04-21 DIAGNOSIS — K43.9 VENTRAL HERNIA WITHOUT OBSTRUCTION OR GANGRENE: ICD-10-CM

## 2025-04-21 DIAGNOSIS — L28.0 NEURODERMATITIS: ICD-10-CM

## 2025-04-21 DIAGNOSIS — Z00.00 MEDICARE ANNUAL WELLNESS VISIT, SUBSEQUENT: Primary | ICD-10-CM

## 2025-04-21 PROCEDURE — 99213 OFFICE O/P EST LOW 20 MIN: CPT | Performed by: FAMILY MEDICINE

## 2025-04-21 PROCEDURE — G2211 COMPLEX E/M VISIT ADD ON: HCPCS | Performed by: FAMILY MEDICINE

## 2025-04-21 PROCEDURE — G0439 PPPS, SUBSEQ VISIT: HCPCS | Performed by: FAMILY MEDICINE

## 2025-04-21 NOTE — PATIENT INSTRUCTIONS
Overall, excellent health.  Suggest hydrocortisone cream to the rash on the chest.  Ventral hernia could be fixed if it became symptomatic but otherwise can just be observed.  Some discussion about claustrophobia.  Not interested in medications.  Could try doing relaxation exercises.    Medicare Preventive Visit Patient Instructions  Thank you for completing your Welcome to Medicare Visit or Medicare Annual Wellness Visit today. Your next wellness visit will be due in one year (4/22/2026).  The screening/preventive services that you may require over the next 5-10 years are detailed below. Some tests may not apply to you based off risk factors and/or age. Screening tests ordered at today's visit but not completed yet may show as past due. Also, please note that scanned in results may not display below.  Preventive Screenings:  Service Recommendations Previous Testing/Comments   Colorectal Cancer Screening  Colonoscopy    Fecal Occult Blood Test (FOBT)/Fecal Immunochemical Test (FIT)  Fecal DNA/Cologuard Test  Flexible Sigmoidoscopy Age: 45-75 years old   Colonoscopy: every 10 years (May be performed more frequently if at higher risk)  OR  FOBT/FIT: every 1 year  OR  Cologuard: every 3 years  OR  Sigmoidoscopy: every 5 years  Screening may be recommended earlier than age 45 if at higher risk for colorectal cancer. Also, an individualized decision between you and your healthcare provider will decide whether screening between the ages of 76-85 would be appropriate. Colonoscopy: Not on file  FOBT/FIT: Not on file  Cologuard: 03/11/2024  Sigmoidoscopy: Not on file          Prostate Cancer Screening Individualized decision between patient and health care provider in men between ages of 55-69   Medicare will cover every 12 months beginning on the day after your 50th birthday PSA: 0.39 ng/mL           Hepatitis C Screening Once for adults born between 1945 and 1965  More frequently in patients at high risk for Hepatitis C  Hep C Antibody: Not on file        Diabetes Screening 1-2 times per year if you're at risk for diabetes or have pre-diabetes Fasting glucose: No results in last 5 years (No results in last 5 years)  A1C: No results in last 5 years (No results in last 5 years)      Cholesterol Screening Once every 5 years if you don't have a lipid disorder. May order more often based on risk factors. Lipid panel: 03/19/2024         Other Preventive Screenings Covered by Medicare:  Abdominal Aortic Aneurysm (AAA) Screening: covered once if your at risk. You're considered to be at risk if you have a family history of AAA or a male between the age of 65-75 who smoking at least 100 cigarettes in your lifetime.  Lung Cancer Screening: covers low dose CT scan once per year if you meet all of the following conditions: (1) Age 55-77; (2) No signs or symptoms of lung cancer; (3) Current smoker or have quit smoking within the last 15 years; (4) You have a tobacco smoking history of at least 20 pack years (packs per day x number of years you smoked); (5) You get a written order from a healthcare provider.  Glaucoma Screening: covered annually if you're considered high risk: (1) You have diabetes OR (2) Family history of glaucoma OR (3)  aged 50 and older OR (4)  American aged 65 and older  Osteoporosis Screening: covered every 2 years if you meet one of the following conditions: (1) Have a vertebral abnormality; (2) On glucocorticoid therapy for more than 3 months; (3) Have primary hyperparathyroidism; (4) On osteoporosis medications and need to assess response to drug therapy.  HIV Screening: covered annually if you're between the age of 15-65. Also covered annually if you are younger than 15 and older than 65 with risk factors for HIV infection. For pregnant patients, it is covered up to 3 times per pregnancy.    Immunizations:  Immunization Recommendations   Influenza Vaccine Annual influenza vaccination during flu  season is recommended for all persons aged >= 6 months who do not have contraindications   Pneumococcal Vaccine   * Pneumococcal conjugate vaccine = PCV13 (Prevnar 13), PCV15 (Vaxneuvance), PCV20 (Prevnar 20)  * Pneumococcal polysaccharide vaccine = PPSV23 (Pneumovax) Adults 19-63 yo with certain risk factors or if 65+ yo  If never received any pneumonia vaccine: recommend Prevnar 20 (PCV20)  Give PCV20 if previously received 1 dose of PCV13 or PPSV23   Hepatitis B Vaccine 3 dose series if at intermediate or high risk (ex: diabetes, end stage renal disease, liver disease)   Respiratory syncytial virus (RSV) Vaccine - COVERED BY MEDICARE PART D  * RSVPreF3 (Arexvy) CDC recommends that adults 60 years of age and older may receive a single dose of RSV vaccine using shared clinical decision-making (SCDM)   Tetanus (Td) Vaccine - COST NOT COVERED BY MEDICARE PART B Following completion of primary series, a booster dose should be given every 10 years to maintain immunity against tetanus. Td may also be given as tetanus wound prophylaxis.   Tdap Vaccine - COST NOT COVERED BY MEDICARE PART B Recommended at least once for all adults. For pregnant patients, recommended with each pregnancy.   Shingles Vaccine (Shingrix) - COST NOT COVERED BY MEDICARE PART B  2 shot series recommended in those 19 years and older who have or will have weakened immune systems or those 50 years and older     Health Maintenance Due:      Topic Date Due    Hepatitis C Screening  Never done    Colorectal Cancer Screening  03/11/2027     Immunizations Due:      Topic Date Due    Pneumococcal Vaccine: 65+ Years (1 of 1 - PCV) Never done    Influenza Vaccine (1) Never done    COVID-19 Vaccine (3 - 2024-25 season) 09/01/2024     Advance Directives   What are advance directives?  Advance directives are legal documents that state your wishes and plans for medical care. These plans are made ahead of time in case you lose your ability to make decisions for  yourself. Advance directives can apply to any medical decision, such as the treatments you want, and if you want to donate organs.   What are the types of advance directives?  There are many types of advance directives, and each state has rules about how to use them. You may choose a combination of any of the following:  Living will:  This is a written record of the treatment you want. You can also choose which treatments you do not want, which to limit, and which to stop at a certain time. This includes surgery, medicine, IV fluid, and tube feedings.   Durable power of  for healthcare (DPAHC):  This is a written record that states who you want to make healthcare choices for you when you are unable to make them for yourself. This person, called a proxy, is usually a family member or a friend. You may choose more than 1 proxy.  Do not resuscitate (DNR) order:  A DNR order is used in case your heart stops beating or you stop breathing. It is a request not to have certain forms of treatment, such as CPR. A DNR order may be included in other types of advance directives.  Medical directive:  This covers the care that you want if you are in a coma, near death, or unable to make decisions for yourself. You can list the treatments you want for each condition. Treatment may include pain medicine, surgery, blood transfusions, dialysis, IV or tube feedings, and a ventilator (breathing machine).  Values history:  This document has questions about your views, beliefs, and how you feel and think about life. This information can help others choose the care that you would choose.  Why are advance directives important?  An advance directive helps you control your care. Although spoken wishes may be used, it is better to have your wishes written down. Spoken wishes can be misunderstood, or not followed. Treatments may be given even if you do not want them. An advance directive may make it easier for your family to make  difficult choices about your care.       © Copyright [employer] 2018 Information is for End User's use only and may not be sold, redistributed or otherwise used for commercial purposes. All illustrations and images included in CareNotes® are the copyrighted property of [employer]. or [employer]

## 2025-04-21 NOTE — PROGRESS NOTES
Name: Rodri Evans      : 1955      MRN: [id number]  Encounter Provider: Tanner Lantigua MD  Encounter Date: 2025   Encounter department: [facility]  :  Assessment & Plan  Medicare annual wellness visit, subsequent         Ventral hernia without obstruction or gangrene         Neurodermatitis         Claustrophobia         Patient Instructions   Overall, excellent health.  Suggest hydrocortisone cream to the rash on the chest.  Ventral hernia could be fixed if it became symptomatic but otherwise can just be observed.  Some discussion about claustrophobia.  Not interested in medications.  Could try doing relaxation exercises.    Medicare Preventive Visit Patient Instructions  Thank you for completing your Welcome to Medicare Visit or Medicare Annual Wellness Visit today. Your next wellness visit will be due in one year (2026).  The screening/preventive services that you may require over the next 5-10 years are detailed below. Some tests may not apply to you based off risk factors and/or age. Screening tests ordered at today's visit but not completed yet may show as past due. Also, please note that scanned in results may not display below.  Preventive Screenings:  Service Recommendations Previous Testing/Comments   Colorectal Cancer Screening  Colonoscopy    Fecal Occult Blood Test (FOBT)/Fecal Immunochemical Test (FIT)  Fecal DNA/Cologuard Test  Flexible Sigmoidoscopy Age: 45-75 years old   Colonoscopy: every 10 years (May be performed more frequently if at higher risk)  OR  FOBT/FIT: every 1 year  OR  Cologuard: every 3 years  OR  Sigmoidoscopy: every 5 years  Screening may be recommended earlier than age 45 if at higher risk for colorectal cancer. Also, an individualized decision between you and your healthcare provider will decide whether screening between the ages of 76-85 would be appropriate. Colonoscopy: Not on file  FOBT/FIT: Not on file  Cologuard:  03/11/2024  Sigmoidoscopy: Not on file          Prostate Cancer Screening Individualized decision between patient and health care provider in men between ages of 55-69   Medicare will cover every 12 months beginning on the day after your 50th birthday PSA: 0.39 ng/mL           Hepatitis C Screening Once for adults born between 1945 and 1965  More frequently in patients at high risk for Hepatitis C Hep C Antibody: Not on file        Diabetes Screening 1-2 times per year if you're at risk for diabetes or have pre-diabetes Fasting glucose: No results in last 5 years (No results in last 5 years)  A1C: No results in last 5 years (No results in last 5 years)      Cholesterol Screening Once every 5 years if you don't have a lipid disorder. May order more often based on risk factors. Lipid panel: 03/19/2024         Other Preventive Screenings Covered by Medicare:  Abdominal Aortic Aneurysm (AAA) Screening: covered once if your at risk. You're considered to be at risk if you have a family history of AAA or a male between the age of 65-75 who smoking at least 100 cigarettes in your lifetime.  Lung Cancer Screening: covers low dose CT scan once per year if you meet all of the following conditions: (1) Age 55-77; (2) No signs or symptoms of lung cancer; (3) Current smoker or have quit smoking within the last 15 years; (4) You have a tobacco smoking history of at least 20 pack years (packs per day x number of years you smoked); (5) You get a written order from a healthcare provider.  Glaucoma Screening: covered annually if you're considered high risk: (1) You have diabetes OR (2) Family history of glaucoma OR (3)  aged 50 and older OR (4)  American aged 65 and older  Osteoporosis Screening: covered every 2 years if you meet one of the following conditions: (1) Have a vertebral abnormality; (2) On glucocorticoid therapy for more than 3 months; (3) Have primary hyperparathyroidism; (4) On osteoporosis  medications and need to assess response to drug therapy.  HIV Screening: covered annually if you're between the age of 15-65. Also covered annually if you are younger than 15 and older than 65 with risk factors for HIV infection. For pregnant patients, it is covered up to 3 times per pregnancy.    Immunizations:  Immunization Recommendations   Influenza Vaccine Annual influenza vaccination during flu season is recommended for all persons aged >= 6 months who do not have contraindications   Pneumococcal Vaccine   * Pneumococcal conjugate vaccine = PCV13 (Prevnar 13), PCV15 (Vaxneuvance), PCV20 (Prevnar 20)  * Pneumococcal polysaccharide vaccine = PPSV23 (Pneumovax) Adults 19-65 yo with certain risk factors or if 65+ yo  If never received any pneumonia vaccine: recommend Prevnar 20 (PCV20)  Give PCV20 if previously received 1 dose of PCV13 or PPSV23   Hepatitis B Vaccine 3 dose series if at intermediate or high risk (ex: diabetes, end stage renal disease, liver disease)   Respiratory syncytial virus (RSV) Vaccine - COVERED BY MEDICARE PART D  * RSVPreF3 (Arexvy) CDC recommends that adults 60 years of age and older may receive a single dose of RSV vaccine using shared clinical decision-making (SCDM)   Tetanus (Td) Vaccine - COST NOT COVERED BY MEDICARE PART B Following completion of primary series, a booster dose should be given every 10 years to maintain immunity against tetanus. Td may also be given as tetanus wound prophylaxis.   Tdap Vaccine - COST NOT COVERED BY MEDICARE PART B Recommended at least once for all adults. For pregnant patients, recommended with each pregnancy.   Shingles Vaccine (Shingrix) - COST NOT COVERED BY MEDICARE PART B  2 shot series recommended in those 19 years and older who have or will have weakened immune systems or those 50 years and older     Health Maintenance Due:      Topic Date Due   • Hepatitis C Screening  Never done   • Colorectal Cancer Screening  03/11/2027     Immunizations  Due:      Topic Date Due   • Pneumococcal Vaccine: 65+ Years (1 of 1 - PCV) Never done   • Influenza Vaccine (1) Never done   • COVID-19 Vaccine (3 - 2024-25 season) 09/01/2024     Advance Directives   What are advance directives?  Advance directives are legal documents that state your wishes and plans for medical care. These plans are made ahead of time in case you lose your ability to make decisions for yourself. Advance directives can apply to any medical decision, such as the treatments you want, and if you want to donate organs.   What are the types of advance directives?  There are many types of advance directives, and each state has rules about how to use them. You may choose a combination of any of the following:  Living will:  This is a written record of the treatment you want. You can also choose which treatments you do not want, which to limit, and which to stop at a certain time. This includes surgery, medicine, IV fluid, and tube feedings.   Durable power of  for healthcare (DPAHC):  This is a written record that states who you want to make healthcare choices for you when you are unable to make them for yourself. This person, called a proxy, is usually a family member or a friend. You may choose more than 1 proxy.  Do not resuscitate (DNR) order:  A DNR order is used in case your heart stops beating or you stop breathing. It is a request not to have certain forms of treatment, such as CPR. A DNR order may be included in other types of advance directives.  Medical directive:  This covers the care that you want if you are in a coma, near death, or unable to make decisions for yourself. You can list the treatments you want for each condition. Treatment may include pain medicine, surgery, blood transfusions, dialysis, IV or tube feedings, and a ventilator (breathing machine).  Values history:  This document has questions about your views, beliefs, and how you feel and think about life. This  information can help others choose the care that you would choose.  Why are advance directives important?  An advance directive helps you control your care. Although spoken wishes may be used, it is better to have your wishes written down. Spoken wishes can be misunderstood, or not followed. Treatments may be given even if you do not want them. An advance directive may make it easier for your family to make difficult choices about your care.       © Copyright [employer] 2018 Information is for End User's use only and may not be sold, redistributed or otherwise used for commercial purposes. All illustrations and images included in CareNotes® are the copyrighted property of [employer] or [employer]         Preventive health issues were discussed with patient, and age appropriate screening tests were ordered as noted in patient's After Visit Summary. Personalized health advice and appropriate referrals for health education or preventive services given if needed, as noted in patient's After Visit Summary.    History of Present Illness     HPI   Here for Medicare wellness exam.  Continues to do well.  Enjoyed taking his RV to [place] and back.  1 year ago, had lipid profile which was okay.  PSA was very low at 0.39.  Cologuard negative.  Normal blood pressure.  He is not on any medications.  Continues on drops for glaucoma.  Notes that he gets claustrophobia.  Unable to sit in the backseat of a car.  Probably for about 15 years.  Does okay in his RV although prefers to be in the front.  Patient Care Team:  Tanner Lantigua MD as PCP - General (Family Medicine)    Review of Systems  No chest pain or shortness of breath.  Bowels are okay.  Bladder okay.   Had a bit of a rash on the left side of his chest which could have been from his wife's new detergent.  Most of it has resolved.  Medical History Reviewed by provider this encounter:  Tobacco  Allergies  Meds  Problems  Med Hx  Surg Hx  Fam Hx        Annual Wellness Visit Questionnaire   Rodri is here for his Subsequent Wellness visit.     Health Risk Assessment:   Patient rates overall health as very good. Patient feels that their physical health rating is same. Patient is satisfied with their life. Eyesight was rated as same. Hearing was rated as same. Patient feels that their emotional and mental health rating is slightly better. Patients states they are sometimes angry. Patient states they are sometimes unusually tired/fatigued. Pain experienced in the last 7 days has been none. Patient states that he has experienced no weight loss or gain in last 6 months.     Depression Screening:   PHQ-2 Score: 0      Fall Risk Screening:   In the past year, patient has experienced: no history of falling in past year      Home Safety:  Patient does not have trouble with stairs inside or outside of their home. Patient has working smoke alarms and has working carbon monoxide detector. Home safety hazards include: none.     Nutrition:   Current diet is Regular.     Medications:   Patient is not currently taking any over-the-counter supplements. Patient is able to manage medications.     Activities of Daily Living (ADLs)/Instrumental Activities of Daily Living (IADLs):   Walk and transfer into and out of bed and chair?: Yes  Dress and groom yourself?: Yes    Bathe or shower yourself?: Yes    Feed yourself? Yes  Do your laundry/housekeeping?: Yes  Manage your money, pay your bills and track your expenses?: Yes  Make your own meals?: Yes    Do your own shopping?: Yes    Previous Hospitalizations:   Any hospitalizations or ED visits within the last 12 months?: No      Advance Care Planning:   Living will: Yes    Advanced directive: Yes      Preventive Screenings      Cardiovascular Screening:    General: Screening Current      Colorectal Cancer Screening:     General: Screening Current      Abdominal Aortic Aneurysm (AAA) Screening:    Risk factors include: age between  "65-74 yo        Lung Cancer Screening:     General: Screening Not Indicated    Immunizations:  - Immunizations due: Influenza, Prevnar 20 and Zoster (Shingrix)    Screening, Brief Intervention, and Referral to Treatment (SBIRT)     Screening  Typical number of drinks in a day: 0  Typical number of drinks in a week: 0  Interpretation: Low risk drinking behavior.    Single Item Drug Screening:  How often have you used an illegal drug (including marijuana) or a prescription medication for non-medical reasons in the past year? never    Single Item Drug Screen Score: 0  Interpretation: Negative screen for possible drug use disorder    Social Drivers of Health     Financial Resource Strain: Low Risk  (3/4/2024)    Overall Financial Resource Strain (CARDIA)    • Difficulty of Paying Living Expenses: Not hard at all   Food Insecurity: No Food Insecurity (4/21/2025)    Hunger Vital Sign    • Worried About Running Out of Food in the Last Year: Never true    • Ran Out of Food in the Last Year: Never true   Transportation Needs: No Transportation Needs (4/21/2025)    PRAPARE - Transportation    • Lack of Transportation (Medical): No    • Lack of Transportation (Non-Medical): No   Housing Stability: Unknown (4/21/2025)    Housing Stability Vital Sign    • Unable to Pay for Housing in the Last Year: No    • Homeless in the Last Year: No   Utilities: Not At Risk (4/21/2025)    [facility] Utilities    • Threatened with loss of utilities: No     No results found.    Objective   /70 (BP Location: Left arm, Patient Position: Sitting, Cuff Size: Adult)   Pulse 62   Temp 97.9 °F (36.6 °C) (Temporal)   Ht 5' 9\" (1.753 m)   Wt 74.2 kg (163 lb 9.6 oz)   SpO2 96%   BMI 24.16 kg/m²     Physical Exam  Appears well.  Lungs are clear.  Heart regular.  Abdomen is soft.  There is a ventral hernia present which is not tender.  Mild dermatitis measuring about 4 x 5 cm underneath the left pectoral muscle.  Slightly reddened.  Mood is upbeat.  " Affect appropriate.

## 2025-04-21 NOTE — ASSESSMENT & PLAN NOTE
Patient Instructions   Overall, excellent health.  Suggest hydrocortisone cream to the rash on the chest.  Ventral hernia could be fixed if it became symptomatic but otherwise can just be observed.  Some discussion about claustrophobia.  Not interested in medications.  Could try doing relaxation exercises.    Medicare Preventive Visit Patient Instructions  Thank you for completing your Welcome to Medicare Visit or Medicare Annual Wellness Visit today. Your next wellness visit will be due in one year (4/22/2026).  The screening/preventive services that you may require over the next 5-10 years are detailed below. Some tests may not apply to you based off risk factors and/or age. Screening tests ordered at today's visit but not completed yet may show as past due. Also, please note that scanned in results may not display below.  Preventive Screenings:  Service Recommendations Previous Testing/Comments   Colorectal Cancer Screening  Colonoscopy    Fecal Occult Blood Test (FOBT)/Fecal Immunochemical Test (FIT)  Fecal DNA/Cologuard Test  Flexible Sigmoidoscopy Age: 45-75 years old   Colonoscopy: every 10 years (May be performed more frequently if at higher risk)  OR  FOBT/FIT: every 1 year  OR  Cologuard: every 3 years  OR  Sigmoidoscopy: every 5 years  Screening may be recommended earlier than age 45 if at higher risk for colorectal cancer. Also, an individualized decision between you and your healthcare provider will decide whether screening between the ages of 76-85 would be appropriate. Colonoscopy: Not on file  FOBT/FIT: Not on file  Cologuard: 03/11/2024  Sigmoidoscopy: Not on file          Prostate Cancer Screening Individualized decision between patient and health care provider in men between ages of 55-69   Medicare will cover every 12 months beginning on the day after your 50th birthday PSA: 0.39 ng/mL           Hepatitis C Screening Once for adults born between 1945 and 1965  More frequently in patients  at high risk for Hepatitis C Hep C Antibody: Not on file        Diabetes Screening 1-2 times per year if you're at risk for diabetes or have pre-diabetes Fasting glucose: No results in last 5 years (No results in last 5 years)  A1C: No results in last 5 years (No results in last 5 years)      Cholesterol Screening Once every 5 years if you don't have a lipid disorder. May order more often based on risk factors. Lipid panel: 03/19/2024         Other Preventive Screenings Covered by Medicare:  Abdominal Aortic Aneurysm (AAA) Screening: covered once if your at risk. You're considered to be at risk if you have a family history of AAA or a male between the age of 65-75 who smoking at least 100 cigarettes in your lifetime.  Lung Cancer Screening: covers low dose CT scan once per year if you meet all of the following conditions: (1) Age 55-77; (2) No signs or symptoms of lung cancer; (3) Current smoker or have quit smoking within the last 15 years; (4) You have a tobacco smoking history of at least 20 pack years (packs per day x number of years you smoked); (5) You get a written order from a healthcare provider.  Glaucoma Screening: covered annually if you're considered high risk: (1) You have diabetes OR (2) Family history of glaucoma OR (3)  aged 50 and older OR (4)  American aged 65 and older  Osteoporosis Screening: covered every 2 years if you meet one of the following conditions: (1) Have a vertebral abnormality; (2) On glucocorticoid therapy for more than 3 months; (3) Have primary hyperparathyroidism; (4) On osteoporosis medications and need to assess response to drug therapy.  HIV Screening: covered annually if you're between the age of 15-65. Also covered annually if you are younger than 15 and older than 65 with risk factors for HIV infection. For pregnant patients, it is covered up to 3 times per pregnancy.    Immunizations:  Immunization Recommendations   Influenza Vaccine Annual  influenza vaccination during flu season is recommended for all persons aged >= 6 months who do not have contraindications   Pneumococcal Vaccine   * Pneumococcal conjugate vaccine = PCV13 (Prevnar 13), PCV15 (Vaxneuvance), PCV20 (Prevnar 20)  * Pneumococcal polysaccharide vaccine = PPSV23 (Pneumovax) Adults 19-65 yo with certain risk factors or if 65+ yo  If never received any pneumonia vaccine: recommend Prevnar 20 (PCV20)  Give PCV20 if previously received 1 dose of PCV13 or PPSV23   Hepatitis B Vaccine 3 dose series if at intermediate or high risk (ex: diabetes, end stage renal disease, liver disease)   Respiratory syncytial virus (RSV) Vaccine - COVERED BY MEDICARE PART D  * RSVPreF3 (Arexvy) CDC recommends that adults 60 years of age and older may receive a single dose of RSV vaccine using shared clinical decision-making (SCDM)   Tetanus (Td) Vaccine - COST NOT COVERED BY MEDICARE PART B Following completion of primary series, a booster dose should be given every 10 years to maintain immunity against tetanus. Td may also be given as tetanus wound prophylaxis.   Tdap Vaccine - COST NOT COVERED BY MEDICARE PART B Recommended at least once for all adults. For pregnant patients, recommended with each pregnancy.   Shingles Vaccine (Shingrix) - COST NOT COVERED BY MEDICARE PART B  2 shot series recommended in those 19 years and older who have or will have weakened immune systems or those 50 years and older     Health Maintenance Due:      Topic Date Due   • Hepatitis C Screening  Never done   • Colorectal Cancer Screening  03/11/2027     Immunizations Due:      Topic Date Due   • Pneumococcal Vaccine: 65+ Years (1 of 1 - PCV) Never done   • Influenza Vaccine (1) Never done   • COVID-19 Vaccine (3 - 2024-25 season) 09/01/2024     Advance Directives   What are advance directives?  Advance directives are legal documents that state your wishes and plans for medical care. These plans are made ahead of time in case you  lose your ability to make decisions for yourself. Advance directives can apply to any medical decision, such as the treatments you want, and if you want to donate organs.   What are the types of advance directives?  There are many types of advance directives, and each state has rules about how to use them. You may choose a combination of any of the following:  Living will:  This is a written record of the treatment you want. You can also choose which treatments you do not want, which to limit, and which to stop at a certain time. This includes surgery, medicine, IV fluid, and tube feedings.   Durable power of  for healthcare (DPAHC):  This is a written record that states who you want to make healthcare choices for you when you are unable to make them for yourself. This person, called a proxy, is usually a family member or a friend. You may choose more than 1 proxy.  Do not resuscitate (DNR) order:  A DNR order is used in case your heart stops beating or you stop breathing. It is a request not to have certain forms of treatment, such as CPR. A DNR order may be included in other types of advance directives.  Medical directive:  This covers the care that you want if you are in a coma, near death, or unable to make decisions for yourself. You can list the treatments you want for each condition. Treatment may include pain medicine, surgery, blood transfusions, dialysis, IV or tube feedings, and a ventilator (breathing machine).  Values history:  This document has questions about your views, beliefs, and how you feel and think about life. This information can help others choose the care that you would choose.  Why are advance directives important?  An advance directive helps you control your care. Although spoken wishes may be used, it is better to have your wishes written down. Spoken wishes can be misunderstood, or not followed. Treatments may be given even if you do not want them. An advance directive may make  it easier for your family to make difficult choices about your care.       © Copyright [employer] 2018 Information is for End User's use only and may not be sold, redistributed or otherwise used for commercial purposes. All illustrations and images included in CareNotes® are the copyrighted property of [employer] or [employer]